# Patient Record
Sex: MALE | Race: WHITE | NOT HISPANIC OR LATINO | Employment: UNEMPLOYED | URBAN - METROPOLITAN AREA
[De-identification: names, ages, dates, MRNs, and addresses within clinical notes are randomized per-mention and may not be internally consistent; named-entity substitution may affect disease eponyms.]

---

## 2023-06-23 PROBLEM — G80.9 CEREBRAL PALSY (MULTI): Status: ACTIVE | Noted: 2023-06-23

## 2023-06-23 PROBLEM — Q87.40 MARFAN SYNDROME (HHS-HCC): Status: ACTIVE | Noted: 2023-06-23

## 2023-06-23 PROBLEM — R00.2 PALPITATIONS: Status: ACTIVE | Noted: 2023-06-23

## 2023-06-23 PROBLEM — M16.11 PRIMARY OSTEOARTHRITIS OF RIGHT HIP: Status: ACTIVE | Noted: 2023-06-23

## 2023-06-23 PROBLEM — R29.91 MARFANOID HABITUS: Status: ACTIVE | Noted: 2023-06-23

## 2023-06-23 PROBLEM — R07.89 ATYPICAL CHEST PAIN: Status: ACTIVE | Noted: 2023-06-23

## 2023-06-23 PROBLEM — R19.00 PULSATILE ABDOMINAL MASS: Status: ACTIVE | Noted: 2023-06-23

## 2023-06-23 PROBLEM — M25.551 RIGHT HIP PAIN: Status: ACTIVE | Noted: 2023-06-23

## 2023-06-29 ENCOUNTER — OFFICE VISIT (OUTPATIENT)
Dept: PRIMARY CARE | Facility: CLINIC | Age: 33
End: 2023-06-29
Payer: COMMERCIAL

## 2023-06-29 VITALS
SYSTOLIC BLOOD PRESSURE: 128 MMHG | DIASTOLIC BLOOD PRESSURE: 82 MMHG | WEIGHT: 132 LBS | HEIGHT: 75 IN | BODY MASS INDEX: 16.41 KG/M2

## 2023-06-29 DIAGNOSIS — Q87.40 MARFAN SYNDROME (HHS-HCC): ICD-10-CM

## 2023-06-29 DIAGNOSIS — Z00.00 GENERAL MEDICAL EXAM: Primary | ICD-10-CM

## 2023-06-29 DIAGNOSIS — G80.2 SPASTIC HEMIPLEGIC CEREBRAL PALSY (MULTI): ICD-10-CM

## 2023-06-29 LAB
ALANINE AMINOTRANSFERASE (SGPT) (U/L) IN SER/PLAS: 8 U/L (ref 10–52)
ALBUMIN (G/DL) IN SER/PLAS: 4.7 G/DL (ref 3.4–5)
ALKALINE PHOSPHATASE (U/L) IN SER/PLAS: 74 U/L (ref 33–120)
ANION GAP IN SER/PLAS: 13 MMOL/L (ref 10–20)
ASPARTATE AMINOTRANSFERASE (SGOT) (U/L) IN SER/PLAS: 11 U/L (ref 9–39)
BASOPHILS (10*3/UL) IN BLOOD BY AUTOMATED COUNT: 0.05 X10E9/L (ref 0–0.1)
BASOPHILS/100 LEUKOCYTES IN BLOOD BY AUTOMATED COUNT: 0.8 % (ref 0–2)
BILIRUBIN TOTAL (MG/DL) IN SER/PLAS: 1 MG/DL (ref 0–1.2)
CALCIUM (MG/DL) IN SER/PLAS: 9.3 MG/DL (ref 8.6–10.3)
CARBON DIOXIDE, TOTAL (MMOL/L) IN SER/PLAS: 26 MMOL/L (ref 21–32)
CHLORIDE (MMOL/L) IN SER/PLAS: 107 MMOL/L (ref 98–107)
CHOLESTEROL (MG/DL) IN SER/PLAS: 113 MG/DL (ref 0–199)
CHOLESTEROL IN HDL (MG/DL) IN SER/PLAS: 53 MG/DL
CHOLESTEROL/HDL RATIO: 2.1
CREATININE (MG/DL) IN SER/PLAS: 0.7 MG/DL (ref 0.5–1.3)
EOSINOPHILS (10*3/UL) IN BLOOD BY AUTOMATED COUNT: 0.07 X10E9/L (ref 0–0.7)
EOSINOPHILS/100 LEUKOCYTES IN BLOOD BY AUTOMATED COUNT: 1.1 % (ref 0–6)
ERYTHROCYTE DISTRIBUTION WIDTH (RATIO) BY AUTOMATED COUNT: 13.2 % (ref 11.5–14.5)
ERYTHROCYTE MEAN CORPUSCULAR HEMOGLOBIN CONCENTRATION (G/DL) BY AUTOMATED: 32.5 G/DL (ref 32–36)
ERYTHROCYTE MEAN CORPUSCULAR VOLUME (FL) BY AUTOMATED COUNT: 94 FL (ref 80–100)
ERYTHROCYTES (10*6/UL) IN BLOOD BY AUTOMATED COUNT: 4.79 X10E12/L (ref 4.5–5.9)
ESTIMATED AVERAGE GLUCOSE FOR HBA1C: 97 MG/DL
GFR MALE: >90 ML/MIN/1.73M2
GLUCOSE (MG/DL) IN SER/PLAS: 100 MG/DL (ref 74–99)
HEMATOCRIT (%) IN BLOOD BY AUTOMATED COUNT: 45.2 % (ref 41–52)
HEMOGLOBIN (G/DL) IN BLOOD: 14.7 G/DL (ref 13.5–17.5)
HEMOGLOBIN A1C/HEMOGLOBIN TOTAL IN BLOOD: 5 %
IMMATURE GRANULOCYTES/100 LEUKOCYTES IN BLOOD BY AUTOMATED COUNT: 0.2 % (ref 0–0.9)
LDL: 52 MG/DL (ref 0–99)
LEUKOCYTES (10*3/UL) IN BLOOD BY AUTOMATED COUNT: 6.5 X10E9/L (ref 4.4–11.3)
LYMPHOCYTES (10*3/UL) IN BLOOD BY AUTOMATED COUNT: 1.14 X10E9/L (ref 1.2–4.8)
LYMPHOCYTES/100 LEUKOCYTES IN BLOOD BY AUTOMATED COUNT: 17.6 % (ref 13–44)
MONOCYTES (10*3/UL) IN BLOOD BY AUTOMATED COUNT: 0.43 X10E9/L (ref 0.1–1)
MONOCYTES/100 LEUKOCYTES IN BLOOD BY AUTOMATED COUNT: 6.6 % (ref 2–10)
NEUTROPHILS (10*3/UL) IN BLOOD BY AUTOMATED COUNT: 4.78 X10E9/L (ref 1.2–7.7)
NEUTROPHILS/100 LEUKOCYTES IN BLOOD BY AUTOMATED COUNT: 73.7 % (ref 40–80)
PLATELETS (10*3/UL) IN BLOOD AUTOMATED COUNT: 208 X10E9/L (ref 150–450)
POTASSIUM (MMOL/L) IN SER/PLAS: 4.7 MMOL/L (ref 3.5–5.3)
PROTEIN TOTAL: 7.4 G/DL (ref 6.4–8.2)
SODIUM (MMOL/L) IN SER/PLAS: 141 MMOL/L (ref 136–145)
THYROTROPIN (MIU/L) IN SER/PLAS BY DETECTION LIMIT <= 0.05 MIU/L: 2.24 MIU/L (ref 0.44–3.98)
TRIGLYCERIDE (MG/DL) IN SER/PLAS: 38 MG/DL (ref 0–149)
UREA NITROGEN (MG/DL) IN SER/PLAS: 17 MG/DL (ref 6–23)
VLDL: 8 MG/DL (ref 0–40)

## 2023-06-29 PROCEDURE — 99395 PREV VISIT EST AGE 18-39: CPT | Performed by: FAMILY MEDICINE

## 2023-06-29 PROCEDURE — 85025 COMPLETE CBC W/AUTO DIFF WBC: CPT

## 2023-06-29 PROCEDURE — 80061 LIPID PANEL: CPT

## 2023-06-29 PROCEDURE — 1036F TOBACCO NON-USER: CPT | Performed by: FAMILY MEDICINE

## 2023-06-29 PROCEDURE — 83036 HEMOGLOBIN GLYCOSYLATED A1C: CPT

## 2023-06-29 PROCEDURE — 84443 ASSAY THYROID STIM HORMONE: CPT

## 2023-06-29 PROCEDURE — 80053 COMPREHEN METABOLIC PANEL: CPT

## 2023-06-29 ASSESSMENT — ENCOUNTER SYMPTOMS
CONSTIPATION: 0
TROUBLE SWALLOWING: 0
HEADACHES: 0
DEPRESSION: 0
MYALGIAS: 0
SHORTNESS OF BREATH: 0
BLOOD IN STOOL: 0
SINUS PAIN: 0
COUGH: 0
FATIGUE: 0
WHEEZING: 0
ABDOMINAL PAIN: 0
NUMBNESS: 0
VOICE CHANGE: 0
HEMATURIA: 0
SORE THROAT: 0
NERVOUS/ANXIOUS: 0
SLEEP DISTURBANCE: 0
ADENOPATHY: 0
DIZZINESS: 0
VOMITING: 0
WOUND: 0
RHINORRHEA: 0
FEVER: 0
PALPITATIONS: 0
WEAKNESS: 1
DYSPHORIC MOOD: 0
BACK PAIN: 0
JOINT SWELLING: 0
DYSURIA: 0
DIARRHEA: 0
NAUSEA: 0

## 2023-06-29 ASSESSMENT — PATIENT HEALTH QUESTIONNAIRE - PHQ9
SUM OF ALL RESPONSES TO PHQ9 QUESTIONS 1 AND 2: 0
2. FEELING DOWN, DEPRESSED OR HOPELESS: NOT AT ALL
1. LITTLE INTEREST OR PLEASURE IN DOING THINGS: NOT AT ALL

## 2023-06-29 NOTE — PATIENT INSTRUCTIONS
I will order PT for your spastic cerebral palsy.  An echocardiogram will be ordered for a check on your valves and aortic root due to your history of Marfan's syndrome.  You have been and are unable to work as a result of your cerebral palsy and Marfans.      Labs were ordered.  Return after the PT for a recheck on your leg pain. Baclofen was discussed but you would like to try PT first to try to avoid medication.

## 2023-06-30 ENCOUNTER — TELEPHONE (OUTPATIENT)
Dept: PRIMARY CARE | Facility: CLINIC | Age: 33
End: 2023-06-30
Payer: COMMERCIAL

## 2023-06-30 NOTE — TELEPHONE ENCOUNTER
----- Message from Cristino Jones DO sent at 6/30/2023  4:09 PM EDT -----  Please let him know his labs showed no significant abnormalities.

## 2023-07-07 ENCOUNTER — APPOINTMENT (OUTPATIENT)
Dept: PRIMARY CARE | Facility: CLINIC | Age: 33
End: 2023-07-07
Payer: COMMERCIAL

## 2023-08-07 DIAGNOSIS — G80.2 SPASTIC HEMIPLEGIC CEREBRAL PALSY (MULTI): Primary | ICD-10-CM

## 2023-08-08 ENCOUNTER — TELEPHONE (OUTPATIENT)
Dept: PRIMARY CARE | Facility: CLINIC | Age: 33
End: 2023-08-08
Payer: COMMERCIAL

## 2023-08-08 NOTE — TELEPHONE ENCOUNTER
----- Message from Cristino Jones DO sent at 8/8/2023  5:25 PM EDT -----  Please let him know his echocardiogram is unchanged from before.  It was basically ok.

## 2023-09-18 DIAGNOSIS — G80.2 SPASTIC HEMIPLEGIC CEREBRAL PALSY (MULTI): Primary | ICD-10-CM

## 2023-09-28 ENCOUNTER — TELEPHONE (OUTPATIENT)
Dept: PRIMARY CARE | Facility: CLINIC | Age: 33
End: 2023-09-28
Payer: COMMERCIAL

## 2023-09-28 NOTE — TELEPHONE ENCOUNTER
Jaclyn from  PT called stating she has been trying to reach you through [facility]. She is trying to help this pt get a brace. She states she would need an order for an AFO for the right leg. Are you able to place this order? Or call Jaclyn directly?

## 2024-01-03 ENCOUNTER — TREATMENT (OUTPATIENT)
Dept: PHYSICAL THERAPY | Facility: CLINIC | Age: 34
End: 2024-01-03
Payer: COMMERCIAL

## 2024-01-03 DIAGNOSIS — R26.81 GAIT INSTABILITY: Primary | ICD-10-CM

## 2024-01-03 DIAGNOSIS — G80.2 SPASTIC HEMIPLEGIC CEREBRAL PALSY (MULTI): ICD-10-CM

## 2024-01-03 DIAGNOSIS — M79.604 RIGHT LEG PAIN: ICD-10-CM

## 2024-01-03 PROCEDURE — 97530 THERAPEUTIC ACTIVITIES: CPT | Mod: GP

## 2024-01-03 NOTE — PROGRESS NOTES
"Physical Therapy    Patient Name: John Doan  MRN: [id number]  Today's Date: 1/3/2024  Time Calculation  Start Time: 0902  Stop Time: 0942  Time Calculation (min): 40 min    Insurance reviewed   Visit number: 8   Alina Castellon after 30 vs/yr       Assessment:  Session focused on improving ankle stability/mobility. Pt continues to lack adequate range to achieve neutral ankle positioning. Educated pt significantly on wear schedule of AFO for prolonged stretching. Pt to benefit from continued skilled PT services to further progress ankle mobility, strength and overall function      Plan;  Continue to progress  Static stretching  LE strengthening     Current Problem:   1. Gait instability  Follow Up In Physical Therapy      2. Spastic hemiplegic cerebral palsy (CMS/HCC)  Follow Up In Physical Therapy      3. Right leg pain  Follow Up In Physical Therapy          Subjective   Pt reports to PT ambulatory without a device.  No medical changes. No falls. Pt reports pain in R hip/R knee still \"comes and goes\" mostly with prolonged activity. Pain hasn't changed in severity. L knee pain on occasion arises as well. Pt got fitted for R AFO, hasn't worn consistently at home, has AFO here today.      Treatments:    Therapeutic Activity (91240): timed minutes 40, units 3     Assessed AFO and fit. Difficulty fitting AFO into shoe due to high side profile shoes; recommended pt to obtain a lower profile shoe, potentially in a wide to accommodate brace. Recommended pt to begin wearing brace at home 15 minutes x 3 times a day within tolerance. Okay to increase time if no pain. Following wear, pt encourage to assess foot and ankle for any redness.     To improve strength and mobility of R LE   -in standing gastro stretch on inclination; 2 x 2 minutes  -in sitting; attempted ankle eversion with furniture slider; requires AA for range  -in standing; angled stretch to promote pronation; 2 x 2 minutes  -lateral step ups on 8 inch " riser with R LE lead; 2 x 20    Education and discussion on HEP and treatment regarding the benefits related to current condition, POC, pathophysiology, and precautions      Goals;   Pt will improve FGA by 3 points to decrease fall risk  Pt will improve squat posturing with a decrease of pain by 2  Pt will demonstrate improved gait mechanics for 150 ft  Pt will demonstrate independence with HEP

## 2024-01-17 ENCOUNTER — TREATMENT (OUTPATIENT)
Dept: PHYSICAL THERAPY | Facility: CLINIC | Age: 34
End: 2024-01-17
Payer: COMMERCIAL

## 2024-01-17 DIAGNOSIS — R26.81 GAIT INSTABILITY: Primary | ICD-10-CM

## 2024-01-17 DIAGNOSIS — G80.2 SPASTIC HEMIPLEGIC CEREBRAL PALSY (MULTI): ICD-10-CM

## 2024-01-17 DIAGNOSIS — M79.604 RIGHT LEG PAIN: ICD-10-CM

## 2024-01-17 PROCEDURE — 97530 THERAPEUTIC ACTIVITIES: CPT | Mod: GP

## 2024-01-17 NOTE — PROGRESS NOTES
Physical Therapy    Patient Name: John Doan  MRN: [id number]  Today's Date: 1/17/2024  Time Calculation  Start Time: 0848  Stop Time: 0930  Time Calculation (min): 42 min    Insurance reviewed   Visit number: 9   Alina Castellon after 30 vs/yr       Assessment:  Session focused on AFO training. Improved mechanics with closed chain activities with addition of AFO. Limited with activities this session as pt doesn't have shoes appropriate to fit AFO. Reiterated importance of AFO wearing schedule to allow for progression through therapy activities, pt verbalizes understanding. Pt to benefit from continued skilled PT services to further progress ankle mobility, strength and overall function      Plan;  Continue to progress  Static stretching  LE strengthening   AFO training    Current Problem:   1. Gait instability  Follow Up In Physical Therapy      2. Spastic hemiplegic cerebral palsy (CMS/HCC)  Follow Up In Physical Therapy      3. Right leg pain  Follow Up In Physical Therapy            Subjective   Pt reports to PT ambulatory without a device.  No medical changes. No falls. Pt has tried wearing AFO at home, feels a deep stretch following, not consistent with completing daily.      Treatments:    Therapeutic Activity (52348): timed minutes 42, units 3     Pt wearing AFO throughout session with the following activities  -Static squat; 2 x 20, B UE support  -SLS; 2 x 2 minute holds, B UE support  -Toe taps with L LE onto 12 in riser; 2 x 2 minutes, B UE support  -Lunges with L LE on furniture slider; 2 x 15, B UE support    Without AFO  -Seated attempted ankle eversion with red theraband; requires AA;  2 x 15    Education and discussion on HEP and treatment regarding the benefits related to current condition, POC, pathophysiology, and precautions      Goals;   Pt will improve FGA by 3 points to decrease fall risk  Pt will improve squat posturing with a decrease of pain by 2  Pt will demonstrate improved gait  mechanics for 150 ft  Pt will demonstrate independence with HEP

## 2024-01-31 ENCOUNTER — APPOINTMENT (OUTPATIENT)
Dept: PHYSICAL THERAPY | Facility: CLINIC | Age: 34
End: 2024-01-31
Payer: COMMERCIAL

## 2024-02-14 ENCOUNTER — TREATMENT (OUTPATIENT)
Dept: PHYSICAL THERAPY | Facility: CLINIC | Age: 34
End: 2024-02-14
Payer: COMMERCIAL

## 2024-02-14 DIAGNOSIS — R26.81 GAIT INSTABILITY: Primary | ICD-10-CM

## 2024-02-14 DIAGNOSIS — M79.604 RIGHT LEG PAIN: ICD-10-CM

## 2024-02-14 DIAGNOSIS — G80.2 SPASTIC HEMIPLEGIC CEREBRAL PALSY (MULTI): ICD-10-CM

## 2024-02-14 PROCEDURE — 97110 THERAPEUTIC EXERCISES: CPT | Mod: GP

## 2024-02-14 NOTE — PROGRESS NOTES
Physical Therapy    Patient Name: John Doan  MRN: [id number]  Today's Date: 2/14/2024  Time Calculation  Start Time: 0859  Stop Time: 0928  Time Calculation (min): 29 min    Insurance reviewed   Visit number: 10   Alina Castellon after 30 vs/yr       Assessment:  Session focused on improving R LE stability.Major focus on building in depth home program and ensuring pt capable of completing exercises. High emphasis on importance of AFO wearing schedule in order to progress up to 8 hours in order to start to ambulate with AFO. Pt will need new shoes to fit brace as well as potential modifications to brace to allow for fit; pt aware of need for follow up with orthotist to complete.  At this time, pt to take a break from therapy and will assess ability to complete home program and AFO wearing schedule.       Plan;  Pt to take a break from therapy, will follow up in 30 days if needs arise, otherwise pt will be fully discharge from therapy    Current Problem:   1. Gait instability        2. Spastic hemiplegic cerebral palsy (CMS/HCC)        3. Right leg pain            Subjective   Pt reports to PT ambulatory without a device.  No medical changes. No falls. Pt reports increasing wear time with AFO, able to tolerate up to 2 hours at a time.     Treatments:  Therapeutic Exercise (71327): timed minutes 29, units 2     Pt wearing AFO throughout session with the following activities  -Forward monster walks with green resistance band, 10 ft x 3  -Reverse monster walks with green resistance band, 10 ft x 3  -Lateral step up onto 4 in riser; x 12  -SLS with cone tap, x 12  -Squats with adductor squeeze x 12      New HEP  - Side Stepping with Resistance at Ankles    - Forward Monster Walks    - Reverse Band Walks    - Step Up    - Lateral Step Up    - Single Leg Cone Touch   - Squats with ball between knees    - Lunge with Counter Support      Education and discussion on HEP and treatment regarding the benefits related to  current condition, POC, pathophysiology, and precautions      Goals;   Pt will improve FGA by 3 points to decrease fall risk  Pt will improve squat posturing with a decrease of pain by 2  Pt will demonstrate improved gait mechanics for 150 ft  Pt will demonstrate independence with HEP

## 2024-11-02 NOTE — PROGRESS NOTES
Goal Outcome Evaluation:  Plan of Care Reviewed With: patient           Outcome Evaluation: Patient new admission from ED. Alert and oreintedx4. tachycardic. Pt stand by to BR, On continues  mL/ Hr.  Ativan given  for CIWA score range 10-8. Tylenol given for c/o 5/10 headache. Continue plan of care.                              Subjective   Patient ID: [id number]     John Doan is a 33 y.o. male who presents for Annual Exam (Fasting).  HPI  He is here for a general medical examination.      Overall, his health is good.      Never a smoker.  Very rare alcohol. No drugs.    Maintains a healthy diet.  He tries to exercise.      He has cerebral palsy and his spasticity in the right leg is worsening.   His ankle has a varus deformity that is worsening.  It is painful at times.  It is making walking more difficult.    No new fam hx problems.    He has cerebral palsy and Marfan's syndrome.    He has never worked.  He did graduate high school.  He attended [employer] for three years but he dropped out after 3 years.      He feels like he has some sort of learning disability.      Since college, he has been living with his parents and not employed.  He has difficulty getting a job.  He cannot drive.  His right leg can only take so much physical activity before it starts getting painful.    Review of Systems   Constitutional:  Negative for fatigue and fever.   HENT:  Negative for congestion, rhinorrhea, sinus pain, sore throat, trouble swallowing and voice change.    Respiratory:  Negative for cough, shortness of breath and wheezing.    Cardiovascular:  Negative for chest pain, palpitations and leg swelling.   Gastrointestinal:  Negative for abdominal pain, blood in stool, constipation, diarrhea, nausea and vomiting.   Genitourinary:  Negative for dysuria, hematuria, scrotal swelling and testicular pain.   Musculoskeletal:  Negative for back pain, joint swelling and myalgias.        Right ankle pain and spasticity.   Skin:  Negative for rash and wound.   Neurological:  Positive for weakness (cerebral palsy affects the strength in his right leg and right hand.). Negative for dizziness, syncope, numbness and headaches.   Hematological:  Negative for adenopathy.   Psychiatric/Behavioral:  Negative for dysphoric mood, self-injury, sleep  "disturbance and suicidal ideas. The patient is not nervous/anxious.        Objective     /82 (BP Location: Left arm, Patient Position: Sitting)   Ht 1.905 m (6' 3\")   Wt 59.9 kg (132 lb)   BMI 16.50 kg/m²      Physical Exam  Vitals reviewed.   Constitutional:       General: He is not in acute distress.     Appearance: Normal appearance. He is not ill-appearing or toxic-appearing.   HENT:      Head: Normocephalic and atraumatic.      Right Ear: Tympanic membrane, ear canal and external ear normal.      Left Ear: Tympanic membrane, ear canal and external ear normal.      Nose: Nose normal.      Mouth/Throat:      Mouth: Mucous membranes are moist.   Eyes:      Extraocular Movements: Extraocular movements intact.      Conjunctiva/sclera: Conjunctivae normal.      Pupils: Pupils are equal, round, and reactive to light.   Cardiovascular:      Rate and Rhythm: Normal rate and regular rhythm.      Heart sounds: No murmur heard.  Pulmonary:      Effort: Pulmonary effort is normal.      Breath sounds: Normal breath sounds.   Abdominal:      General: Bowel sounds are normal. There is no distension.      Palpations: Abdomen is soft. There is no mass.      Tenderness: There is no abdominal tenderness. There is no guarding or rebound.   Musculoskeletal:      Cervical back: Neck supple.      Right lower leg: No edema.      Left lower leg: No edema.      Comments: Kyphoscoliosis. Thoracic spine.    Right leg spastic atrophy with varus deformity of the right ankle.      Right hand decreased strength with mildly spastic atrophy in the right arm.      Gait--ambulates with difficulty due to ankle deformity.     Skin:     Coloration: Skin is not jaundiced or pale.      Findings: No rash.   Neurological:      General: No focal deficit present.      Mental Status: He is alert and oriented to person, place, and time. Mental status is at baseline.   Psychiatric:         Mood and Affect: Mood normal.         Behavior: Behavior " normal.         Thought Content: Thought content normal.         Judgment: Judgment normal.         Assessment/Plan   Problem List Items Addressed This Visit          Multi-system (Lupus, Sarcoid)    Marfan syndrome    Relevant Orders    Transthoracic Echo (TTE) Complete       Neuro    Cerebral palsy (CMS/HCC)    Relevant Orders    Referral to Physical Therapy     Other Visit Diagnoses       General medical exam    -  Primary    Relevant Orders    CBC and Auto Differential    Hemoglobin A1C    Comprehensive Metabolic Panel    Lipid Panel    Thyroid Stimulating Hormone        I will order PT for your spastic cerebral palsy.  An echocardiogram will be ordered for a check on your valves and aortic root due to your history of Marfan's syndrome.  You have been and are unable to work as a result of your cerebral palsy and Marfans.      Labs were ordered.  Return after the PT for a recheck on your leg pain. Baclofen was discussed but you would like to try PT first to try to avoid medication.    Cristino Jones, DO

## 2025-05-13 ENCOUNTER — APPOINTMENT (OUTPATIENT)
Dept: PRIMARY CARE | Facility: CLINIC | Age: 35
End: 2025-05-13
Payer: COMMERCIAL

## 2025-05-13 VITALS
TEMPERATURE: 97.6 F | DIASTOLIC BLOOD PRESSURE: 80 MMHG | SYSTOLIC BLOOD PRESSURE: 116 MMHG | BODY MASS INDEX: 16.78 KG/M2 | HEIGHT: 75 IN | WEIGHT: 135 LBS

## 2025-05-13 DIAGNOSIS — Z00.00 GENERAL MEDICAL EXAM: Primary | ICD-10-CM

## 2025-05-13 DIAGNOSIS — I34.1 MITRAL VALVE PROLAPSE: ICD-10-CM

## 2025-05-13 DIAGNOSIS — Q87.40 MARFAN SYNDROME (HHS-HCC): ICD-10-CM

## 2025-05-13 DIAGNOSIS — G80.2 SPASTIC HEMIPLEGIC CEREBRAL PALSY (MULTI): ICD-10-CM

## 2025-05-13 PROCEDURE — 99395 PREV VISIT EST AGE 18-39: CPT | Performed by: FAMILY MEDICINE

## 2025-05-13 PROCEDURE — 3008F BODY MASS INDEX DOCD: CPT | Performed by: FAMILY MEDICINE

## 2025-05-13 PROCEDURE — 1036F TOBACCO NON-USER: CPT | Performed by: FAMILY MEDICINE

## 2025-05-13 ASSESSMENT — ENCOUNTER SYMPTOMS
VOICE CHANGE: 0
RHINORRHEA: 0
ABDOMINAL PAIN: 0
DYSURIA: 0
DYSPHORIC MOOD: 0
FREQUENCY: 0
ARTHRALGIAS: 0
MYALGIAS: 0
SORE THROAT: 0
FEVER: 0
TROUBLE SWALLOWING: 0
HEADACHES: 1
HEMATURIA: 0
BLOOD IN STOOL: 0
WOUND: 0
DIARRHEA: 0
SLEEP DISTURBANCE: 0
WHEEZING: 0
DIZZINESS: 0
NAUSEA: 0
ADENOPATHY: 0
FATIGUE: 0
SHORTNESS OF BREATH: 0
VOMITING: 0
WEAKNESS: 0
COUGH: 0
NUMBNESS: 1
NERVOUS/ANXIOUS: 1
CONSTIPATION: 0
BACK PAIN: 0
PALPITATIONS: 0
SINUS PAIN: 0

## 2025-05-13 ASSESSMENT — PROMIS GLOBAL HEALTH SCALE
CARRYOUT_SOCIAL_ACTIVITIES: GOOD
CARRYOUT_PHYSICAL_ACTIVITIES: MOSTLY
RATE_MENTAL_HEALTH: GOOD
RATE_AVERAGE_FATIGUE: MODERATE
RATE_SOCIAL_SATISFACTION: EXCELLENT
RATE_AVERAGE_PAIN: 2
RATE_GENERAL_HEALTH: GOOD
RATE_QUALITY_OF_LIFE: VERY GOOD
RATE_PHYSICAL_HEALTH: GOOD
EMOTIONAL_PROBLEMS: SOMETIMES

## 2025-05-13 NOTE — PROGRESS NOTES
"Subjective   Patient ID: [id number]     John Doan is a 35 y.o. male who presents for Annual Exam (Fasting) and Referral (Echocardiogram referral requested.).  HPI  He is here for a general medical examination.      He feels well in general.  He denies any surgery or hospitalization over the last year.         He has a history of cerebral palsy and Marfan's syndrome.  He is requesting an order for an echocardiogram.  He has mitral valve prolapse.      Medications Ordered Prior to Encounter[1]    Tobacco Use: Low Risk  (5/13/2025)    Patient History     Smoking Tobacco Use: Never     Smokeless Tobacco Use: Never     Passive Exposure: Not on file   Very rare alcohol.   No drug use.  He tries to do light exercise, cardio.  He maintains a healthy diet.     Family History[2]  Dad and sister have depression.  Mother is healthy.    Review of Systems   Constitutional:  Negative for fatigue and fever.   HENT:  Negative for congestion, rhinorrhea, sinus pain, sore throat, trouble swallowing and voice change.    Respiratory:  Negative for cough, shortness of breath and wheezing.    Cardiovascular:  Negative for chest pain, palpitations and leg swelling.   Gastrointestinal:  Negative for abdominal pain, blood in stool, constipation, diarrhea, nausea and vomiting.   Genitourinary:  Negative for dysuria, frequency, hematuria, scrotal swelling and testicular pain.   Musculoskeletal:  Negative for arthralgias, back pain and myalgias.        He has a pain in his left arm when he moves his neck.  He has had numbness and tingling in the left arm and hand.  That went away after two months or so.  He had pain in the tricep area for about eight months.  That has all gone away.  It started in September.  Two or three weeks ago it was \"lightly there\" but it has since gone away.   Skin:  Negative for rash and wound.   Neurological:  Positive for numbness and headaches (he has been getting headaches if he would move his head in a certain " "position.  the bad headaches have gone away but he has a very low level of a headache that is always there.). Negative for dizziness, syncope and weakness.   Hematological:  Negative for adenopathy.   Psychiatric/Behavioral:  Negative for dysphoric mood, self-injury, sleep disturbance and suicidal ideas. The patient is nervous/anxious (a little.  nothing extreme.).                Objective     /80   Temp 36.4 °C (97.6 °F) (Skin)   Ht 1.905 m (6' 3\")   Wt 61.2 kg (135 lb)   BMI 16.87 kg/m²      Physical Exam  Vitals reviewed.   Constitutional:       General: He is not in acute distress.     Appearance: Normal appearance. He is not ill-appearing or toxic-appearing.   HENT:      Head: Normocephalic and atraumatic.      Right Ear: Tympanic membrane, ear canal and external ear normal.      Left Ear: Tympanic membrane, ear canal and external ear normal.      Nose: Nose normal.      Mouth/Throat:      Mouth: Mucous membranes are moist.   Eyes:      Extraocular Movements: Extraocular movements intact.      Conjunctiva/sclera: Conjunctivae normal.      Pupils: Pupils are equal, round, and reactive to light.   Cardiovascular:      Rate and Rhythm: Normal rate and regular rhythm.      Heart sounds: No murmur heard.  Pulmonary:      Effort: Pulmonary effort is normal.      Breath sounds: Normal breath sounds.   Abdominal:      General: Bowel sounds are normal. There is no distension.      Palpations: Abdomen is soft. There is no mass.      Tenderness: There is no abdominal tenderness. There is no guarding or rebound.   Genitourinary:     Penis: Normal.       Testes: Normal.   Musculoskeletal:         General: No tenderness.      Cervical back: Neck supple.      Right lower leg: No edema.      Left lower leg: No edema.   Skin:     Coloration: Skin is not jaundiced or pale.      Findings: No rash.   Neurological:      General: No focal deficit present.      Mental Status: He is alert and oriented to person, place, and " time. Mental status is at baseline.   Psychiatric:         Mood and Affect: Mood normal.         Behavior: Behavior normal.         Thought Content: Thought content normal.         Judgment: Judgment normal.         Assessment/Plan   Problem List Items Addressed This Visit       Cerebral palsy    Marfan syndrome ([facility])    Relevant Orders    Transthoracic Echo Limited     Other Visit Diagnoses         General medical exam    -  Primary    Relevant Orders    CBC and Auto Differential    Hemoglobin A1C    Comprehensive Metabolic Panel    Lipid Panel    Thyroid Stimulating Hormone    Urinalysis with Reflex Microscopic      Mitral valve prolapse        Relevant Orders    Transthoracic Echo Limited          I will order labs today.  I ordered an echocardiogram.  Return if your headaches worsen or if your neck and arm pain come back.  Return in one year for a physical and sooner if you have any new or worsened problems.   Cristino Jones,           [1]   No current outpatient medications on file prior to visit.     No current facility-administered medications on file prior to visit.   [2]   Family History  Problem Relation Name Age of Onset    No Known Problems Mother      Diabetes Father      Fibromyalgia Sister

## 2025-05-13 NOTE — PATIENT INSTRUCTIONS
I will order labs today.  I ordered an echocardiogram.  Return if your headaches worsen or if your neck and arm pain come back.  Return in one year for a physical and sooner if you have any new or worsened problems.

## 2025-05-14 LAB
ALBUMIN SERPL-MCNC: 4.8 G/DL (ref 3.6–5.1)
ALP SERPL-CCNC: 44 U/L (ref 36–130)
ALT SERPL-CCNC: 11 U/L (ref 9–46)
ANION GAP SERPL CALCULATED.4IONS-SCNC: 12 MMOL/L (CALC) (ref 7–17)
APPEARANCE UR: CLEAR
AST SERPL-CCNC: 16 U/L (ref 10–40)
BACTERIA #/AREA URNS HPF: ABNORMAL /HPF
BASOPHILS # BLD AUTO: 50 CELLS/UL (ref 0–200)
BASOPHILS NFR BLD AUTO: 1 %
BILIRUB SERPL-MCNC: 1.6 MG/DL (ref 0.2–1.2)
BILIRUB UR QL STRIP: NEGATIVE
BUN SERPL-MCNC: 16 MG/DL (ref 7–25)
CALCIUM SERPL-MCNC: 9.5 MG/DL (ref 8.6–10.3)
CHLORIDE SERPL-SCNC: 105 MMOL/L (ref 98–110)
CHOLEST SERPL-MCNC: 115 MG/DL
CHOLEST/HDLC SERPL: 1.9 (CALC)
CO2 SERPL-SCNC: 25 MMOL/L (ref 20–32)
COLOR UR: YELLOW
CREAT SERPL-MCNC: 0.81 MG/DL (ref 0.6–1.26)
EGFRCR SERPLBLD CKD-EPI 2021: 119 ML/MIN/1.73M2
EOSINOPHIL # BLD AUTO: 90 CELLS/UL (ref 15–500)
EOSINOPHIL NFR BLD AUTO: 1.8 %
ERYTHROCYTE [DISTWIDTH] IN BLOOD BY AUTOMATED COUNT: 12.5 % (ref 11–15)
EST. AVERAGE GLUCOSE BLD GHB EST-MCNC: 97 MG/DL
EST. AVERAGE GLUCOSE BLD GHB EST-SCNC: 5.4 MMOL/L
GLUCOSE SERPL-MCNC: 89 MG/DL (ref 65–99)
GLUCOSE UR QL STRIP: NEGATIVE
HBA1C MFR BLD: 5 %
HCT VFR BLD AUTO: 45.4 % (ref 38.5–50)
HDLC SERPL-MCNC: 59 MG/DL
HGB BLD-MCNC: 15.2 G/DL (ref 13.2–17.1)
HGB UR QL STRIP: NEGATIVE
HYALINE CASTS #/AREA URNS LPF: ABNORMAL /LPF
KETONES UR QL STRIP: ABNORMAL
LDLC SERPL CALC-MCNC: 45 MG/DL (CALC)
LEUKOCYTE ESTERASE UR QL STRIP: NEGATIVE
LYMPHOCYTES # BLD AUTO: 1060 CELLS/UL (ref 850–3900)
LYMPHOCYTES NFR BLD AUTO: 21.2 %
MCH RBC QN AUTO: 31.7 PG (ref 27–33)
MCHC RBC AUTO-ENTMCNC: 33.5 G/DL (ref 32–36)
MCV RBC AUTO: 94.8 FL (ref 80–100)
MONOCYTES # BLD AUTO: 420 CELLS/UL (ref 200–950)
MONOCYTES NFR BLD AUTO: 8.4 %
NEUTROPHILS # BLD AUTO: 3380 CELLS/UL (ref 1500–7800)
NEUTROPHILS NFR BLD AUTO: 67.6 %
NITRITE UR QL STRIP: NEGATIVE
NONHDLC SERPL-MCNC: 56 MG/DL (CALC)
PH UR STRIP: 7.5 [PH] (ref 5–8)
PLATELET # BLD AUTO: 211 THOUSAND/UL (ref 140–400)
PMV BLD REES-ECKER: 12.9 FL (ref 7.5–12.5)
POTASSIUM SERPL-SCNC: 4.7 MMOL/L (ref 3.5–5.3)
PROT SERPL-MCNC: 7.4 G/DL (ref 6.1–8.1)
PROT UR QL STRIP: ABNORMAL
RBC # BLD AUTO: 4.79 MILLION/UL (ref 4.2–5.8)
RBC #/AREA URNS HPF: ABNORMAL /HPF
SERVICE CMNT-IMP: ABNORMAL
SODIUM SERPL-SCNC: 142 MMOL/L (ref 135–146)
SP GR UR STRIP: 1.02 (ref 1–1.03)
SQUAMOUS #/AREA URNS HPF: ABNORMAL /HPF
TRIGL SERPL-MCNC: 39 MG/DL
TSH SERPL-ACNC: 2.55 MIU/L (ref 0.4–4.5)
WBC # BLD AUTO: 5 THOUSAND/UL (ref 3.8–10.8)
WBC #/AREA URNS HPF: ABNORMAL /HPF

## 2025-06-24 ENCOUNTER — HOSPITAL ENCOUNTER (OUTPATIENT)
Dept: CARDIOLOGY | Facility: HOSPITAL | Age: 35
Discharge: HOME | End: 2025-06-24
Payer: COMMERCIAL

## 2025-06-24 LAB
EJECTION FRACTION APICAL 4 CHAMBER: 60.6
EJECTION FRACTION: 53 %
LEFT ATRIUM VOLUME AREA LENGTH INDEX BSA: 27.1 ML/M2
LEFT VENTRICLE INTERNAL DIMENSION DIASTOLE: 4.65 CM (ref 3.5–6)
LV EJECTION FRACTION BIPLANE: 60 %
MITRAL VALVE E/A RATIO: 1.72

## 2025-06-24 PROCEDURE — 93321 DOPPLER ECHO F-UP/LMTD STD: CPT

## 2025-07-16 DIAGNOSIS — Z00.00 GENERAL MEDICAL EXAM: Primary | ICD-10-CM

## 2025-07-25 LAB
25(OH)D3+25(OH)D2 SERPL-MCNC: 13 NG/ML (ref 30–100)
VIT B12 SERPL-MCNC: 276 PG/ML (ref 200–1100)

## 2025-07-27 DIAGNOSIS — E55.9 VITAMIN D DEFICIENCY: Primary | ICD-10-CM

## 2025-08-04 RX ORDER — ERGOCALCIFEROL 1.25 MG/1
1.25 CAPSULE ORAL WEEKLY
Qty: 12 CAPSULE | Refills: 1 | Status: SHIPPED | OUTPATIENT
Start: 2025-08-04 | End: 2026-01-19